# Patient Record
Sex: FEMALE | Race: WHITE | Employment: FULL TIME | URBAN - METROPOLITAN AREA
[De-identification: names, ages, dates, MRNs, and addresses within clinical notes are randomized per-mention and may not be internally consistent; named-entity substitution may affect disease eponyms.]

---

## 2017-04-05 ENCOUNTER — TELEPHONE (OUTPATIENT)
Dept: FAMILY MEDICINE | Facility: CLINIC | Age: 47
End: 2017-04-05

## 2017-04-05 NOTE — TELEPHONE ENCOUNTER
4/5/2017    Call Regarding Preventive Health Screening Cervical/PAP    Attempt 1    Message on voicemail     Comments:         Outreach   Ebony Saeed

## 2017-04-21 ENCOUNTER — TELEPHONE (OUTPATIENT)
Dept: FAMILY MEDICINE | Facility: CLINIC | Age: 47
End: 2017-04-21

## 2017-04-21 DIAGNOSIS — F32.A DEPRESSION: ICD-10-CM

## 2017-04-21 RX ORDER — CITALOPRAM HYDROBROMIDE 40 MG/1
40 TABLET ORAL EVERY MORNING
Qty: 90 TABLET | Refills: 3 | Status: CANCELLED | OUTPATIENT
Start: 2017-04-21

## 2017-04-21 NOTE — TELEPHONE ENCOUNTER
Left message for pt to return call to clinic.     Pt has not been seen since 1/7/16. Is she still taking this medication? Please update PHQ-9.   Yaritza Johnson RN

## 2017-04-21 NOTE — TELEPHONE ENCOUNTER
[facility] calling on behalf of pt stating she is now under her  name of John Brown and is needing a refill on this medication. He said that she has been trying to get this refilled since February. Pt has not been seen since January 13th of 2016.      citalopram (CELEXA) 40 MG tablet       Last Written Prescription Date: 01/13/16  Last Fill Quantity: 90, # refills: 3  Last Office Visit with [facility] primary care provider:  01/13/16        Last PHQ-9 score on record=   PHQ-9 SCORE 1/7/2016   Total Score -   Total Score 9       [facility]

## 2017-04-24 NOTE — TELEPHONE ENCOUNTER
Patient has two refills left of Celexa at her pharmacy near her  She declined PHQ9 test  PHQ-9 score:    PHQ-9 SCORE 1/7/2016   Total Score -   Total Score 9     She reports she is going to switch providers    Shima RENNER Rn

## 2017-05-15 DIAGNOSIS — F32.A DEPRESSION: ICD-10-CM

## 2017-05-15 NOTE — TELEPHONE ENCOUNTER
Citalopram     Last Written Prescription Date: 01/13/16  Last Fill Quantity: 90, # refills: 3  Last Office Visit with [facility] primary care provider:  01/13/16        Last PHQ-9 score on record=   PHQ-9 SCORE 1/7/2016   Total Score -   Total Score 9

## 2017-05-16 RX ORDER — CITALOPRAM HYDROBROMIDE 40 MG/1
40 TABLET ORAL EVERY MORNING
Qty: 30 TABLET | Refills: 0 | Status: SHIPPED | OUTPATIENT
Start: 2017-05-16

## 2017-05-26 NOTE — TELEPHONE ENCOUNTER
Call Regarding Preventive Health Screening Cervical/PAP    Attempt 2    Message on voicemail     Comments:       Outreach   Marianne Preston

## 2017-06-02 NOTE — TELEPHONE ENCOUNTER
6/2/2017    Call Regarding Preventive Health Screening Cervical/PAP    Attempt 3    Message on voicemail     Comments:       Outreach   KV

## 2017-07-15 ENCOUNTER — HEALTH MAINTENANCE LETTER (OUTPATIENT)
Age: 47
End: 2017-07-15